# Patient Record
Sex: MALE | Race: WHITE
[De-identification: names, ages, dates, MRNs, and addresses within clinical notes are randomized per-mention and may not be internally consistent; named-entity substitution may affect disease eponyms.]

---

## 2019-01-07 ENCOUNTER — HOSPITAL ENCOUNTER (OUTPATIENT)
Dept: HOSPITAL 96 - M.SUR | Age: 74
Setting detail: OBSERVATION
LOS: 1 days | Discharge: HOME | End: 2019-01-08
Attending: INTERNAL MEDICINE | Admitting: INTERNAL MEDICINE
Payer: MEDICARE

## 2019-01-07 VITALS — DIASTOLIC BLOOD PRESSURE: 60 MMHG | SYSTOLIC BLOOD PRESSURE: 148 MMHG

## 2019-01-07 VITALS — SYSTOLIC BLOOD PRESSURE: 129 MMHG | DIASTOLIC BLOOD PRESSURE: 48 MMHG

## 2019-01-07 VITALS — DIASTOLIC BLOOD PRESSURE: 51 MMHG | SYSTOLIC BLOOD PRESSURE: 147 MMHG

## 2019-01-07 VITALS — WEIGHT: 220 LBS | BODY MASS INDEX: 35.36 KG/M2 | HEIGHT: 65.98 IN

## 2019-01-07 DIAGNOSIS — E11.9: ICD-10-CM

## 2019-01-07 DIAGNOSIS — E66.9: ICD-10-CM

## 2019-01-07 DIAGNOSIS — M94.211: ICD-10-CM

## 2019-01-07 DIAGNOSIS — G47.33: ICD-10-CM

## 2019-01-07 DIAGNOSIS — Z86.73: ICD-10-CM
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
## 2019-01-07 DIAGNOSIS — Z79.899: ICD-10-CM

## 2019-01-07 DIAGNOSIS — Z79.01: ICD-10-CM
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
## 2019-01-07 DIAGNOSIS — I10: ICD-10-CM

## 2019-01-07 DIAGNOSIS — I48.91: ICD-10-CM

## 2019-01-07 DIAGNOSIS — R00.1: ICD-10-CM

## 2019-01-07 DIAGNOSIS — M75.41: ICD-10-CM

## 2019-01-07 DIAGNOSIS — M75.101: Primary | ICD-10-CM
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
## 2019-01-07 DIAGNOSIS — S43.401A: ICD-10-CM

## 2019-01-07 LAB
ANION GAP SERPL CALC-SCNC: 13 MMOL/L (ref 7–16)
BUN SERPL-MCNC: 27 MG/DL (ref 7–18)
CALCIUM SERPL-MCNC: 9.6 MG/DL (ref 8.5–10.1)
CHLORIDE SERPL-SCNC: 105 MMOL/L (ref 98–107)
CO2 SERPL-SCNC: 23 MMOL/L (ref 21–32)
CREAT SERPL-MCNC: 1.6 MG/DL (ref 0.6–1.3)
GLUCOSE SERPL-MCNC: 109 MG/DL (ref 70–99)
HCT VFR BLD CALC: 41.1 % (ref 42–52)
HGB BLD-MCNC: 14.1 GM/DL (ref 14–18)
MCH RBC QN AUTO: 34.5 PG (ref 26–34)
MCHC RBC AUTO-ENTMCNC: 34.3 G/DL (ref 28–37)
MCV RBC: 100.5 FL (ref 80–100)
MPV: 9.7 FL. (ref 7.2–11.1)
PLATELET COUNT*: 215 THOU/UL (ref 150–400)
POTASSIUM SERPL-SCNC: 4.3 MMOL/L (ref 3.5–5.1)
RBC # BLD AUTO: 4.08 MIL/UL (ref 4.5–6)
RDW-CV: 13.9 % (ref 10.5–14.5)
SODIUM SERPL-SCNC: 141 MMOL/L (ref 136–145)
WBC # BLD AUTO: 7.3 THOU/UL (ref 4–11)

## 2019-01-07 NOTE — EKG
[place] [address]
Phone:  [phone]                     ELECTROCARDIOGRAM REPORT      
_______________________________________________________________________________
 
Name:       NICK SOTO                  Room:           [address] 
M.R.#:  [id number]      Account #:      [id number]  
Admission:  19     Attend Phys:    LISANDRO Stewart
Discharge:               Date of Birth:  [birth year]  
         Report #: [id number]
    [id number]
_______________________________________________________________________________
THIS REPORT FOR:  //name//                      
 
                          [facility]
                                       
Test Date:    2019               Test Time:    08:09:02
Pat Name:     NICK SOTO              Department:   
Patient ID:   [id number]            Room:         [facility]
Gender:       M                        Technician:   FRANCIS
:          1945               Requested By: Ahmet Delgado
Order Number: [id number]    Cheryl GOMEZ:   Manolo Balderas
                                 Measurements
Intervals                              Axis          
Rate:         62                       P:            66
LA:           154                      QRS:          46
QRSD:         94                       T:            26
QT:           464                                    
QTc:          472                                    
                           Interpretive Statements
Sinus rhythm with pac's
Low voltage, extremity leads
No previous ECG available for comparison
 
Electronically Signed On 2019 16:31:34 CST by Manolo Balderas
[email or web address]
 
 
 
 
 
 
 
 
 
 
 
 
 
 
 
 
 
 
 
  <ELECTRONICALLY SIGNED>
                                           By: Manolo Balderas MD, [facility]     
  19     1631
D: 19   _____________________________________
T: 19   Manolo Balderas MD, FACC       /EPI

## 2019-01-07 NOTE — OP
[address]  [address]                    OPERATIVE REPORT              
_______________________________________________________________________________
 
Name:       CHARLESNICK E                  Room:           [address] Tamia OSBORNE#:  [id number]      Account #:      [id number]  
Admission:  01/07/19     Attend Phys:    LISANDRO Stewart
Discharge:  01/08/19     Date of Birth:  [birth year]  
         Report #: [id number]
                                                                     [id number]  
_______________________________________________________________________________
THIS REPORT FOR:  //name//                      
 
CC: [place]
 
DATE OF SERVICE:  01/07/2019
 
 
PREOPERATIVE DIAGNOSIS:  Right shoulder full thickness rotator cuff tear with
degenerative joint disease.
 
POSTOPERATIVE DIAGNOSES:
1.  Large full thickness rotator cuff tear, right shoulder.
2.  Impingement syndrome.
3.  Degenerative labral tear.
4.  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
 
PROCEDURES:
1.  Right shoulder arthroscopy.
2.  Arthroscopic rotator cuff repair of large tear.
3.  Subacromial decompression.
4.  Debridement of degenerative labral tear.
 
SURGEON:  Ahmet Delgado DO.
 
FIRST ASSISTANT:  Nate Peraza DO
 
ANESTHESIA:  General.
 
ESTIMATED BLOOD LOSS:  10 mL.
 
COMPLICATIONS:  None.
 
DRAINS:  None.
 
SPECIMEN REMOVED:  None.
 
CONDITION OF PATIENT:  Stable to PACU.
 
INDICATIONS FOR PROCEDURE:  The patient is a very pleasant 73-year-old male seen
in my clinic regarding right shoulder pain he has had for quite some time.  MRI
was consistent with a full thickness rotator cuff tear.  He also had
degenerative changes seen on x-ray and MRI.  I had a long discussion with him
regarding treatment options including shoulder arthroscopy with cuff repair
versus reverse total shoulder arthroplasty.  He wished to proceed with the
 
 
 
[address]  [address]                    OPERATIVE REPORT              
_______________________________________________________________________________
 
Name:       NICK SOTO                  Room:           [address] Tamia GALLEGO.#:  [id number]      Account #:      [id number]  
Admission:  01/07/19     Attend Phys:    LISANDRO Stewart
Discharge:  01/08/19     Date of Birth:  [birth year]  
         Report #: [id number]
                                                                     [id number]  
_______________________________________________________________________________
arthroscopic procedure, knowing that he could have continued problems from his
arthritis.  I recommended right shoulder arthroscopy with possible open rotator
cuff repair.  I discussed the procedure, risks, benefits, complications,
indications in detail with him.  Risks discussed include but not limited to
infection, neurovascular injury, continued worsening pain, need for further
surgery, DVT, PE, and anesthesia complications.  He did express understanding
and wished to proceed with surgery.
 
DESCRIPTION OF PROCEDURE:  After consent was obtained, the patient was taken to
the Operative Suite and placed in the supine position on the operating room
table.  He was given benefit of general anesthesia.  He was then placed in a
beachchair position.  All bony prominences were well padded.  The right upper
extremity was sterilely prepped and draped in usual fashion.  Preop timeout was
obtained to confirm the correct patient, procedure and operative site.
 
Surgery began with standard posterolateral portal incision, arthroscope was
introduced and diagnostic arthroscopy was performed.  An anterior portal was
established using spinal needle technique.  He did have grade 3-4 chondromalacia
throughout the glenohumeral joint.  He also had a fairly frayed and degenerative
type labral tear.  The undersurface of the rotator cuff was inspected and he had
a large full thickness rotator cuff tear without significant retraction.  The
shaver was used to debride the degenerative labral tear from the glenohumeral
joint.  At this point, we went subacromial.  Subacromial decompression was
performed exposing the rotator cuff tear well.  Once the tear was isolated, the
edge of the tear was debrided with the shaver.  The footprint was debrided with
shaver as well as a rasp.  Once the footprint was prepared, we then like to
proceed with double row repair using [employer]rex SpeedBridge system.  Two medial row
anchors were placed just off the articular surface.  These sutures were then
individually passed 4 times through the rotator cuff for better footprint hold. 
We then placed two lateral row anchors after crisscrossing the appropriate
sutures.  This did bring the cuff nicely down to the footprint.  These anchors
were then placed, all had good purchase.  He did have mildly osteoporotic bone. 
Once the final anchors were placed, the arm was taken through gentle range of
motion.  This did appear to be stable.  The excess sutures were then cut.  Final
pictures were taken, noting good repair with good footprint coverage.  The wound
was then thoroughly irrigated with sterile saline.  All fluid was then drained. 
Instruments were removed.  Incisions were closed with 3-0 nylon in simple
interrupted fashion.  Sterile dressing was applied in the form of Xeroform, 4 x
4s, ABD pad, Medipore tape.  He was placed in a slingshot immobilizer
postoperatively.  He did tolerate the procedure well without complications.  All
needle and sponge counts were correct x 2 at the end of the procedure.  He was
taken to Recovery Room in stable condition.
 
 
                       
                                        By:                                
                 
D: 01/07/19 1136_______________________________________
T: 01/07/19 1222Davisancho Delgado DO               /nt

## 2019-01-08 VITALS — SYSTOLIC BLOOD PRESSURE: 148 MMHG | DIASTOLIC BLOOD PRESSURE: 65 MMHG

## 2019-01-08 VITALS — DIASTOLIC BLOOD PRESSURE: 76 MMHG | SYSTOLIC BLOOD PRESSURE: 151 MMHG

## 2019-01-08 VITALS — SYSTOLIC BLOOD PRESSURE: 158 MMHG | DIASTOLIC BLOOD PRESSURE: 46 MMHG
